# Patient Record
Sex: MALE | Race: WHITE | Employment: FULL TIME | URBAN - METROPOLITAN AREA
[De-identification: names, ages, dates, MRNs, and addresses within clinical notes are randomized per-mention and may not be internally consistent; named-entity substitution may affect disease eponyms.]

---

## 2018-07-20 ENCOUNTER — OFFICE VISIT (OUTPATIENT)
Dept: SURGERY | Facility: CLINIC | Age: 53
End: 2018-07-20
Payer: COMMERCIAL

## 2018-07-20 VITALS
HEIGHT: 71 IN | DIASTOLIC BLOOD PRESSURE: 93 MMHG | HEART RATE: 80 BPM | BODY MASS INDEX: 23.8 KG/M2 | SYSTOLIC BLOOD PRESSURE: 151 MMHG | WEIGHT: 170 LBS | TEMPERATURE: 99 F

## 2018-07-20 DIAGNOSIS — Z12.11 SCREENING FOR COLON CANCER: Primary | ICD-10-CM

## 2018-07-20 PROCEDURE — S0285 CNSLT BEFORE SCREEN COLONOSC: HCPCS | Performed by: SURGERY

## 2018-07-20 RX ORDER — POLYETHYLENE GLYCOL 3350, SODIUM CHLORIDE, SODIUM BICARBONATE, POTASSIUM CHLORIDE 420; 11.2; 5.72; 1.48 G/4L; G/4L; G/4L; G/4L
POWDER, FOR SOLUTION ORAL
COMMUNITY

## 2018-07-20 NOTE — H&P
New Patient Visit Note       Active Problems      1.  Screening for colon cancer        Chief Complaint   Patient presents with:  Colonoscopy: colonoscopy consult, last c-scope done 7/27/2009      History of Present Illness     The patient presents at the  Review of Systems  The Review of Systems has been reviewed by me during today. Review of Systems  A comprehensive 10 point review of systems was completed. Pertinent positives and negatives noted in the the HPI.     Physical Findings   BP (!) 151/93   P None    Follow Up  Return in about 4 weeks (around 8/17/2018).     Juani Boyd MD

## 2018-07-31 ENCOUNTER — TELEPHONE (OUTPATIENT)
Dept: SURGERY | Facility: CLINIC | Age: 53
End: 2018-07-31

## 2018-07-31 DIAGNOSIS — Z12.11 SPECIAL SCREENING FOR MALIGNANT NEOPLASMS, COLON: Primary | ICD-10-CM

## 2018-08-24 ENCOUNTER — LAB REQUISITION (OUTPATIENT)
Dept: LAB | Facility: HOSPITAL | Age: 53
End: 2018-08-24
Payer: COMMERCIAL

## 2018-08-24 DIAGNOSIS — Z12.11 ENCOUNTER FOR SCREENING FOR MALIGNANT NEOPLASM OF COLON: ICD-10-CM

## 2018-08-24 PROCEDURE — 88305 TISSUE EXAM BY PATHOLOGIST: CPT | Performed by: SURGERY

## 2018-09-18 ENCOUNTER — TELEPHONE (OUTPATIENT)
Dept: SURGERY | Facility: CLINIC | Age: 53
End: 2018-09-18

## 2018-09-18 NOTE — TELEPHONE ENCOUNTER
Appt scheduled for F/U on colonoscopy   Future Appointments   Date Time Provider Katina Rios   9/19/2018  1:30 PM Tamar Samuel MD [facility]

## 2018-09-19 ENCOUNTER — TELEPHONE (OUTPATIENT)
Dept: SURGERY | Facility: CLINIC | Age: 53
End: 2018-09-19

## 2018-09-19 ENCOUNTER — OFFICE VISIT (OUTPATIENT)
Dept: SURGERY | Facility: CLINIC | Age: 53
End: 2018-09-19
Payer: COMMERCIAL

## 2018-09-19 VITALS
SYSTOLIC BLOOD PRESSURE: 134 MMHG | DIASTOLIC BLOOD PRESSURE: 87 MMHG | TEMPERATURE: 98 F | BODY MASS INDEX: 23.8 KG/M2 | WEIGHT: 170 LBS | HEIGHT: 71 IN | HEART RATE: 68 BPM

## 2018-09-19 DIAGNOSIS — D12.6 TUBULAR ADENOMA OF COLON: Primary | ICD-10-CM

## 2018-09-19 DIAGNOSIS — K52.9 COLITIS: ICD-10-CM

## 2018-09-19 PROCEDURE — 99212 OFFICE O/P EST SF 10 MIN: CPT | Performed by: SURGERY

## 2018-09-19 RX ORDER — OMEPRAZOLE 20 MG/1
CAPSULE, DELAYED RELEASE ORAL
COMMUNITY
Start: 2009-08-03

## 2018-09-19 RX ORDER — BIOTIN 1 MG
TABLET ORAL
COMMUNITY

## 2018-09-19 RX ORDER — OMEGA-3/DHA/EPA/FISH OIL 60 MG-90MG
CAPSULE ORAL
COMMUNITY

## 2018-09-19 NOTE — PROGRESS NOTES
Follow Up Visit Note       Active Problems      1. Tubular adenoma of colon    2.  Colitis          Chief Complaint   Patient presents with:  Colonoscopy: Est pt F/U colonoscopy done 8/24/2018        History of Present Illness    The patient presents follow Highest education level: Not on file    Social Needs      Financial resource strain: Not on file      Food insecurity - worry: Not on file      Food insecurity - inability: Not on file      Transportation needs - medical: Not on file      Transportation ne weakness. Hematological: Negative for adenopathy. Does not bruise/bleed easily. Psychiatric/Behavioral: Negative for behavioral problems and sleep disturbance.         Physical Findings   /87   Pulse 68   Temp 98.2 °F (36.8 °C) (Oral)   Ht 71\" MD Lizzie

## 2021-10-20 ENCOUNTER — TELEPHONE (OUTPATIENT)
Dept: GASTROENTEROLOGY | Age: 56
End: 2021-10-20

## 2021-10-26 ENCOUNTER — OFFICE VISIT (OUTPATIENT)
Dept: SURGERY | Facility: CLINIC | Age: 56
End: 2021-10-26
Payer: COMMERCIAL

## 2021-10-26 VITALS
WEIGHT: 166.81 LBS | SYSTOLIC BLOOD PRESSURE: 158 MMHG | BODY MASS INDEX: 23 KG/M2 | HEART RATE: 72 BPM | TEMPERATURE: 98 F | DIASTOLIC BLOOD PRESSURE: 89 MMHG

## 2021-10-26 DIAGNOSIS — Z86.010 HISTORY OF COLON POLYPS: Primary | ICD-10-CM

## 2021-10-26 PROCEDURE — 99203 OFFICE O/P NEW LOW 30 MIN: CPT | Performed by: STUDENT IN AN ORGANIZED HEALTH CARE EDUCATION/TRAINING PROGRAM

## 2021-10-26 PROCEDURE — 3079F DIAST BP 80-89 MM HG: CPT | Performed by: STUDENT IN AN ORGANIZED HEALTH CARE EDUCATION/TRAINING PROGRAM

## 2021-10-26 PROCEDURE — 3077F SYST BP >= 140 MM HG: CPT | Performed by: STUDENT IN AN ORGANIZED HEALTH CARE EDUCATION/TRAINING PROGRAM

## 2021-10-26 RX ORDER — POLYETHYLENE GLYCOL 3350, SODIUM CHLORIDE, SODIUM BICARBONATE, POTASSIUM CHLORIDE 420; 11.2; 5.72; 1.48 G/4L; G/4L; G/4L; G/4L
POWDER, FOR SOLUTION ORAL
Qty: 1 EACH | Refills: 0 | Status: SHIPPED | OUTPATIENT
Start: 2021-10-26

## 2021-10-26 NOTE — H&P
Office Visit H&P       Active Problems      1. History of colon polyps        Chief Complaint   Patient presents with:  Colonoscopy: NP cscope-Pt denies new concerns, Pt. denies bleeding, abdominal pain. Pt. states family hx of polyps in colon.  Pt. had col daily., Disp: , Rfl:   •  PEG 3350-KCl-Na Bicarb-NaCl 420 g Oral Recon Soln, Take by mouth.  Starting at 4:00 pm the night before procedure, drink 8 ounces of the prep every 15-20 minutes until finished, Disp: , Rfl:       Review of Systems  A comprehensive colonoscopy     No orders of the defined types were placed in this encounter. Imaging & Referrals   None    Follow Up  No follow-ups on file.     Dr. Codie Vincent (MARILIN) [facility] Surgery  10/26/2021  4:48 PM

## 2021-10-26 NOTE — PATIENT INSTRUCTIONS
Starting at 4:00 pm the night before procedure, drink 8 ounces of the prep every 15-20 minutes until finished. Stool must be clear liquid prior to colonoscopy.

## 2022-04-29 ENCOUNTER — PATIENT OUTREACH (OUTPATIENT)
Dept: SURGERY | Facility: CLINIC | Age: 57
End: 2022-04-29

## 2023-05-08 ENCOUNTER — TELEPHONE (OUTPATIENT)
Dept: DERMATOLOGY | Age: 58
End: 2023-05-08

## 2023-10-19 NOTE — PROGRESS NOTES
Abnormal results; call pt to make appointment. Addended by: RASHID BUCK on: 10/19/2023 08:05 AM     Modules accepted: Orders

## 2024-11-08 ENCOUNTER — APPOINTMENT (OUTPATIENT)
Dept: DERMATOLOGY | Age: 59
End: 2024-11-08

## 2024-11-08 DIAGNOSIS — D22.9 MULTIPLE BENIGN MELANOCYTIC NEVI: Primary | ICD-10-CM

## 2024-11-08 DIAGNOSIS — Z12.83 SCREENING EXAM FOR SKIN CANCER: ICD-10-CM

## 2024-11-08 DIAGNOSIS — D18.01 CHERRY ANGIOMA: ICD-10-CM

## 2024-11-08 DIAGNOSIS — Z80.8 FAMILY HISTORY OF MALIGNANT MELANOMA: ICD-10-CM

## 2024-11-08 DIAGNOSIS — L82.1 SK (SEBORRHEIC KERATOSIS): ICD-10-CM

## 2025-11-08 ENCOUNTER — APPOINTMENT (OUTPATIENT)
Dept: DERMATOLOGY | Age: 60
End: 2025-11-08

## 2025-11-22 ENCOUNTER — APPOINTMENT (OUTPATIENT)
Dept: DERMATOLOGY | Age: 60
End: 2025-11-22

## (undated) NOTE — LETTER
18    Patient: Jorge Mcknight  : 1965 Visit date: 2018    Dear  Dr. Flo Black,    Thank you for referring Jorge Mcknight to my practice. Please find my assessment and plan below.                Assessment   Tubular adenoma of col

## (undated) NOTE — LETTER
18    Patient: Danika Meredith  : 1965 Visit date: 2018    Dear  Dr. Sallie Flannery,    Thank you for referring Danikameño Schultzn to my practice. Please find my assessment and plan below.                 Assessment   Screening for colon c